# Patient Record
Sex: MALE | URBAN - METROPOLITAN AREA
[De-identification: names, ages, dates, MRNs, and addresses within clinical notes are randomized per-mention and may not be internally consistent; named-entity substitution may affect disease eponyms.]

---

## 2024-03-18 ENCOUNTER — APPOINTMENT (RX ONLY)
Dept: URBAN - METROPOLITAN AREA CLINIC 146 | Facility: CLINIC | Age: 74
Setting detail: DERMATOLOGY
End: 2024-03-18

## 2024-03-18 DIAGNOSIS — Z41.9 ENCOUNTER FOR PROCEDURE FOR PURPOSES OTHER THAN REMEDYING HEALTH STATE, UNSPECIFIED: ICD-10-CM

## 2024-03-18 PROCEDURE — ? COSMETIC QUOTE

## 2024-03-18 PROCEDURE — ? COSMETIC CONSULTATION: GENERAL

## 2024-03-18 NOTE — PROCEDURE: COSMETIC QUOTE
Body Procedure 4 Price/Unit (In Dollars- Use Only Numbers And Decimals): 0.00
Laser 13 Units: 0
Face Procedure 3: Sculptra
Misc Procedure 1 Price/Unit (In Dollars- Use Only Numbers And Decimals): 94.00
Face Procedure 3 Units: 3
Apply Facility Fee: No
Facility Fee Units (Optional): 1
Misc Procedure 2: papaya Enzyme wash
Face Procedure 1 Price/Unit (In Dollars- Use Only Numbers And Decimals): 265.00
Face Procedure 1 Units: 2
Misc Procedure 2 Price/Unit (In Dollars- Use Only Numbers And Decimals): 40.00
Body Procedure 3: Buttocks
Face Procedure 2 Price/Unit (In Dollars- Use Only Numbers And Decimals): 600.00
Proposed Date (Optional): 3-18-24
Include Sales Tax On Surgeon's Fees: Yes
Misc Procedure 1: skincare hydrating serum
Face Procedure 3 Price/Unit (In Dollars- Use Only Numbers And Decimals): 1600.00
Face Procedure 1: Hydrafacial
Detail Level: Simple
Face Procedure 2: PRP+ SKIN PEN
Face Procedure 2 Units: 4